# Patient Record
Sex: FEMALE | Race: WHITE | NOT HISPANIC OR LATINO | URBAN - METROPOLITAN AREA
[De-identification: names, ages, dates, MRNs, and addresses within clinical notes are randomized per-mention and may not be internally consistent; named-entity substitution may affect disease eponyms.]

---

## 2018-11-05 ENCOUNTER — OFFICE (OUTPATIENT)
Dept: URBAN - METROPOLITAN AREA CLINIC 33 | Facility: CLINIC | Age: 63
End: 2018-11-05

## 2018-11-05 PROCEDURE — 00031: CPT

## 2018-11-13 ENCOUNTER — ON CAMPUS - OUTPATIENT (OUTPATIENT)
Dept: URBAN - METROPOLITAN AREA HOSPITAL 14 | Facility: HOSPITAL | Age: 63
End: 2018-11-13
Payer: MEDICARE

## 2018-11-13 DIAGNOSIS — Z80.0 FAMILY HISTORY OF MALIGNANT NEOPLASM OF DIGESTIVE ORGANS: ICD-10-CM

## 2018-11-13 DIAGNOSIS — Z86.010 PERSONAL HISTORY OF COLONIC POLYPS: ICD-10-CM

## 2018-11-13 PROCEDURE — G0105 COLORECTAL SCRN; HI RISK IND: HCPCS

## 2021-08-12 ENCOUNTER — OFFICE (OUTPATIENT)
Dept: URBAN - METROPOLITAN AREA CLINIC 79 | Facility: CLINIC | Age: 66
End: 2021-08-12
Payer: COMMERCIAL

## 2021-08-12 VITALS
HEART RATE: 70 BPM | DIASTOLIC BLOOD PRESSURE: 93 MMHG | TEMPERATURE: 98.6 F | SYSTOLIC BLOOD PRESSURE: 126 MMHG | WEIGHT: 184 LBS | HEIGHT: 65 IN

## 2021-08-12 DIAGNOSIS — R10.30 LOWER ABDOMINAL PAIN, UNSPECIFIED: ICD-10-CM

## 2021-08-12 DIAGNOSIS — R10.32 LEFT LOWER QUADRANT PAIN: ICD-10-CM

## 2021-08-12 DIAGNOSIS — K57.30 DIVERTICULOSIS OF LARGE INTESTINE WITHOUT PERFORATION OR ABS: ICD-10-CM

## 2021-08-12 PROCEDURE — 99214 OFFICE O/P EST MOD 30 MIN: CPT | Performed by: PHYSICIAN ASSISTANT

## 2021-08-12 RX ORDER — LACTOBACILLUS RHAMNOSUS GG 10B CELL
CAPSULE ORAL
Qty: 30 | Refills: 0 | Status: ACTIVE
Start: 2021-08-12

## 2021-08-12 RX ORDER — LEVOFLOXACIN 500 MG/1
TABLET, FILM COATED ORAL
Qty: 10 | Refills: 0 | Status: COMPLETED
Start: 2021-08-12 | End: 2021-10-04

## 2021-08-12 RX ORDER — METRONIDAZOLE 500 MG/1
TABLET, FILM COATED ORAL
Qty: 30 | Refills: 0 | Status: COMPLETED
Start: 2021-08-12 | End: 2021-10-04

## 2021-08-12 NOTE — SERVICEHPINOTES
ROQUE OMER   is a   65 yo white female who complains of "diverticulitis" concern given recurrent LLQ pain x 2 weeks ago that has become progressively worse. She initially thought LLQ pain was a "pulled muscle" but it has increased in severity and feels like prior h/o acute diverticulitis. She states acute LLQ pain, rated 8/10, constant, worse when laying on her left side. She has BM 1x/day, BSS type 5-6 "loose": No mucous or blood in stools. Past diverticulitis event in 2015 confirmed on CT scan and resolved with abx. Last colonoscopy in 04/2013 mild diverticulosis to descending colon and sigmoid colon. Denies fevers, n/v, upper abdominal pain, melena, BRBPR, weight loss.BR

## 2021-10-04 ENCOUNTER — OFFICE (OUTPATIENT)
Dept: URBAN - METROPOLITAN AREA CLINIC 79 | Facility: CLINIC | Age: 66
End: 2021-10-04
Payer: COMMERCIAL

## 2021-10-04 VITALS
WEIGHT: 185 LBS | HEART RATE: 74 BPM | SYSTOLIC BLOOD PRESSURE: 110 MMHG | DIASTOLIC BLOOD PRESSURE: 74 MMHG | OXYGEN SATURATION: 96.9 % | HEIGHT: 65 IN

## 2021-10-04 DIAGNOSIS — K57.92 DIVERTICULITIS OF INTESTINE, PART UNSPECIFIED, WITHOUT PERFO: ICD-10-CM

## 2021-10-04 DIAGNOSIS — K57.30 DIVERTICULOSIS OF LARGE INTESTINE WITHOUT PERFORATION OR ABS: ICD-10-CM

## 2021-10-04 PROCEDURE — 99214 OFFICE O/P EST MOD 30 MIN: CPT | Performed by: PHYSICIAN ASSISTANT

## 2021-10-04 RX ORDER — DICYCLOMINE HYDROCHLORIDE 20 MG/1
TABLET ORAL
Qty: 120 | Refills: 11 | Status: COMPLETED
Start: 2021-10-04 | End: 2022-06-21

## 2021-10-04 NOTE — SERVICEHPINOTES
ROQUE OMER   is a   67 yo white female who is here for f/u concerning diverticulosis. She was last seen in 08/2021 for abdominal pain that led to CT scan noting acute sigmoid diverticulitis. She informs of resolution to pain s/p completion of abx regimen. She has daily BMs, well formed. She last used rx Dicyclomine 4 weeks ago for lower abdominal pain that has resolved at this time. Denies chest pain, n/v, abdominal pain, melena, BRBPR, weight loss. Last colonoscopy in 11/2018. No other complaints.

br
br

## 2022-06-21 ENCOUNTER — OFFICE (OUTPATIENT)
Dept: URBAN - METROPOLITAN AREA CLINIC 79 | Facility: CLINIC | Age: 67
End: 2022-06-21
Payer: COMMERCIAL

## 2022-06-21 VITALS
DIASTOLIC BLOOD PRESSURE: 88 MMHG | HEART RATE: 70 BPM | SYSTOLIC BLOOD PRESSURE: 134 MMHG | TEMPERATURE: 97 F | HEIGHT: 65 IN | WEIGHT: 185 LBS

## 2022-06-21 DIAGNOSIS — K57.92 DIVERTICULITIS OF INTESTINE, PART UNSPECIFIED, WITHOUT PERFO: ICD-10-CM

## 2022-06-21 DIAGNOSIS — Z80.0 FAMILY HISTORY OF MALIGNANT NEOPLASM OF DIGESTIVE ORGANS: ICD-10-CM

## 2022-06-21 DIAGNOSIS — Z86.010 PERSONAL HISTORY OF COLONIC POLYPS: ICD-10-CM

## 2022-06-21 PROCEDURE — 99214 OFFICE O/P EST MOD 30 MIN: CPT | Performed by: PHYSICIAN ASSISTANT

## 2022-06-21 NOTE — SERVICEHPINOTES
ROQUE OMER   is a   66 yo white female who is here for f/u acute diverticulitis dx on CT scan from 05/11/2022 ordered by our office due to recurrent symptoms. Past h/o recurrent diverticulitis in 2015 and most recent 08/2021 confirmed on CT scan. She recalls having constipation that led up to acute LLQ pain: No fevers, n/v, blood in stool. She completed abx regimen on levofloxacin-metronidazole that provided resolution of pain. At this time taking Culturelle probiotics qd. She informs of "low carb diet"  No high fiber diet implemented. She has BMs 1-2x/day, BSS type 4-5 : No blood in stool or BRBPR. Last colonoscopy in 2018 was for surveillance indication that noted moderate diverticulosis to descending-sigmoid colon internal small hemorrhoids, otherwise normal colon w/ RC 5 yrs. Rare NSAID use. Denies chest pain, n/v, abdominal pain, change in BMs, melena, BRBPR, weight loss. No other complaints. br br
5/11/2022 - CT Abd/Pelvis --acute diverticulitis of proximal sigmoid colon so complete abx levofloxacin metronidazole br8/12/2021 - CT Abd/Pelvis w/IV Contrast - acute uncomplicated diverticulitis to sigmoid colon no abscessbr4/30/2015 - CT Abdomen and Pelvis - diverticulitis has resolved w. subsequent flex sig in 8 weeksbr

## 2022-08-30 ENCOUNTER — AMBULATORY SURGICAL CENTER (OUTPATIENT)
Dept: URBAN - METROPOLITAN AREA SURGERY 23 | Facility: SURGERY | Age: 67
End: 2022-08-30
Payer: COMMERCIAL

## 2022-08-30 DIAGNOSIS — K57.92 DIVERTICULITIS OF INTESTINE, PART UNSPECIFIED, WITHOUT PERFO: ICD-10-CM

## 2022-08-30 DIAGNOSIS — K56.2 VOLVULUS: ICD-10-CM

## 2022-08-30 PROCEDURE — 45378 DIAGNOSTIC COLONOSCOPY: CPT | Performed by: INTERNAL MEDICINE

## 2024-01-18 ENCOUNTER — TELEHEALTH PROVIDED OTHER THAN IN PATIENT'S HOME (OUTPATIENT)
Dept: URBAN - METROPOLITAN AREA TELEHEALTH 12 | Facility: TELEHEALTH | Age: 69
End: 2024-01-18
Payer: COMMERCIAL

## 2024-01-18 VITALS — HEIGHT: 65 IN | WEIGHT: 190 LBS

## 2024-01-18 DIAGNOSIS — E66.3 OVERWEIGHT: ICD-10-CM

## 2024-01-18 DIAGNOSIS — R14.0 ABDOMINAL DISTENSION (GASEOUS): ICD-10-CM

## 2024-01-18 DIAGNOSIS — Z87.19 PERSONAL HISTORY OF OTHER DISEASES OF THE DIGESTIVE SYSTEM: ICD-10-CM

## 2024-01-18 DIAGNOSIS — R10.32 LEFT LOWER QUADRANT PAIN: ICD-10-CM

## 2024-01-18 PROCEDURE — 99214 OFFICE O/P EST MOD 30 MIN: CPT | Mod: 95 | Performed by: INTERNAL MEDICINE

## 2024-01-18 RX ORDER — DICYCLOMINE HYDROCHLORIDE 10 MG/1
CAPSULE ORAL
Qty: 30 | Refills: 2 | Status: ACTIVE
Start: 2024-01-18

## 2024-01-18 NOTE — SERVICENOTES
Patient's visit was conducted through [employer] video telecommunication. Patient consented before the start of visit as to understanding of privacy concerns, possible technological failure, and their responsibility of carrying out instructions of plan.
Records reviewed. 
Patient understands and agrees with plan. Questions/concerns addressed

## 2024-01-18 NOTE — SERVICEHPINOTES
PATIENT VERIFIED BY DATE OF BIRTH AND NAME. Patient has been consented for this telecommunication visit.   ROQUE OMER   is a   68  female who presents for follow up of LLQ pain. She thought it might have been diverticulitis but by the time she had the CT scan there was no evidence of diverticulitis. The CT noted stool but she did not feel constipated. She had backed off of benefiber prior to symptom onset and has since reintroduced the benefiber. She states that when she does take benefiber it helps with BMs. She is also on a probiotic. With the probiotic and benefiber her BMS are fairly regular. She notes a lot of gas/bloating but this is a chronic issue. Sometimes has cramping in association and has been prescribed dicyclomine previously. No new issues. She denies any nausea, vomiting, melena, rectal bleeding, loss of appetite, weight loss, heartburn, dysphagia. Her last colonoscopy was in 2022 and revealed diverticulosis. She has never met with a surgeon but she has had at least 3 episodes of diverticulitis.
br
br ROS as above, otherwise remainder of ROS is negative.

## 2025-01-30 ENCOUNTER — OFFICE (OUTPATIENT)
Dept: URBAN - METROPOLITAN AREA CLINIC 79 | Facility: CLINIC | Age: 70
End: 2025-01-30
Payer: MEDICARE

## 2025-01-30 VITALS
SYSTOLIC BLOOD PRESSURE: 137 MMHG | TEMPERATURE: 97.4 F | WEIGHT: 185 LBS | HEIGHT: 65 IN | HEART RATE: 73 BPM | DIASTOLIC BLOOD PRESSURE: 95 MMHG

## 2025-01-30 DIAGNOSIS — R12 HEARTBURN: ICD-10-CM

## 2025-01-30 DIAGNOSIS — R19.8 OTHER SPECIFIED SYMPTOMS AND SIGNS INVOLVING THE DIGESTIVE S: ICD-10-CM

## 2025-01-30 DIAGNOSIS — R13.10 DYSPHAGIA, UNSPECIFIED: ICD-10-CM

## 2025-01-30 DIAGNOSIS — R07.89 OTHER CHEST PAIN: ICD-10-CM

## 2025-01-30 PROCEDURE — 99214 OFFICE O/P EST MOD 30 MIN: CPT | Performed by: PHYSICIAN ASSISTANT

## 2025-01-30 RX ORDER — ESOMEPRAZOLE MAGNESIUM 20 MG/1
FOR SUSPENSION ORAL
Qty: 30 | Refills: 2 | Status: ACTIVE
Start: 2025-01-30

## 2025-04-07 ENCOUNTER — OFFICE (OUTPATIENT)
Dept: URBAN - METROPOLITAN AREA CLINIC 79 | Facility: CLINIC | Age: 70
End: 2025-04-07
Payer: MEDICARE

## 2025-04-07 VITALS
DIASTOLIC BLOOD PRESSURE: 82 MMHG | TEMPERATURE: 97.5 F | SYSTOLIC BLOOD PRESSURE: 134 MMHG | HEART RATE: 71 BPM | WEIGHT: 188 LBS | HEIGHT: 65 IN

## 2025-04-07 DIAGNOSIS — K22.2 ESOPHAGEAL OBSTRUCTION: ICD-10-CM

## 2025-04-07 PROCEDURE — 99214 OFFICE O/P EST MOD 30 MIN: CPT | Performed by: PHYSICIAN ASSISTANT
